# Patient Record
Sex: FEMALE | Race: WHITE
[De-identification: names, ages, dates, MRNs, and addresses within clinical notes are randomized per-mention and may not be internally consistent; named-entity substitution may affect disease eponyms.]

---

## 2017-07-06 ENCOUNTER — HOSPITAL ENCOUNTER (OUTPATIENT)
Dept: HOSPITAL 18 - NAV RAD | Age: 82
Discharge: HOME | End: 2017-07-06
Attending: INTERNAL MEDICINE
Payer: MEDICARE

## 2017-07-06 DIAGNOSIS — M17.9: ICD-10-CM

## 2017-07-06 DIAGNOSIS — M25.461: ICD-10-CM

## 2017-07-06 DIAGNOSIS — M25.552: Primary | ICD-10-CM

## 2017-07-06 NOTE — RAD
RIGHT KNEE FOUR VIEWS:

 

History: Fall two months ago. Pain. 

 

Comparison: None. 

 

FINDINGS: 

There appears to be a suprapatellar effusion. There is severe tricompartmental degenerative change. 
No fracture or malalignment. 

 

IMPRESSION: 

1. Suprapatellar effusion. 

2. Severe tricompartmental degenerative change. 

 

POS: [facility]

## 2017-07-12 ENCOUNTER — HOSPITAL ENCOUNTER (EMERGENCY)
Dept: HOSPITAL 18 - NAV ERS | Age: 82
Discharge: HOME | End: 2017-07-12
Payer: MEDICARE

## 2017-07-12 DIAGNOSIS — Z79.891: ICD-10-CM

## 2017-07-12 DIAGNOSIS — R10.32: ICD-10-CM

## 2017-07-12 DIAGNOSIS — M81.0: ICD-10-CM

## 2017-07-12 DIAGNOSIS — F32.9: ICD-10-CM

## 2017-07-12 DIAGNOSIS — Z79.52: ICD-10-CM

## 2017-07-12 DIAGNOSIS — K21.9: ICD-10-CM

## 2017-07-12 DIAGNOSIS — M16.12: Primary | ICD-10-CM

## 2017-07-12 DIAGNOSIS — E78.5: ICD-10-CM

## 2017-07-12 DIAGNOSIS — Z79.899: ICD-10-CM

## 2017-07-12 DIAGNOSIS — G20: ICD-10-CM

## 2017-07-12 DIAGNOSIS — M17.11: ICD-10-CM

## 2017-07-12 LAB
ALBUMIN SERPL BCG-MCNC: 3.8 G/DL (ref 3.4–4.8)
ALP SERPL-CCNC: 77 U/L (ref 40–150)
ALT SERPL W P-5'-P-CCNC: (no result) U/L (ref 8–55)
AMYLASE SERPL-CCNC: 53 U/L (ref 20–160)
ANION GAP SERPL CALC-SCNC: 16 MMOL/L (ref 10–20)
AST SERPL-CCNC: 14 U/L (ref 5–34)
BACTERIA UR QL AUTO: (no result) HPF
BASOPHILS # BLD AUTO: 0.1 THOU/UL (ref 0–0.2)
BASOPHILS NFR BLD AUTO: 1 % (ref 0–1)
BILIRUB SERPL-MCNC: 0.7 MG/DL (ref 0.2–1.2)
BUN SERPL-MCNC: 19 MG/DL (ref 9.8–20.1)
CALCIUM SERPL-MCNC: 9.4 MG/DL (ref 7.8–10.44)
CHLORIDE SERPL-SCNC: 102 MMOL/L (ref 98–107)
CO2 SERPL-SCNC: 27 MMOL/L (ref 23–31)
CREAT CL PREDICTED SERPL C-G-VRATE: 0 ML/MIN (ref 70–130)
EOSINOPHIL # BLD AUTO: 0.1 THOU/UL (ref 0–0.7)
EOSINOPHIL NFR BLD AUTO: 1.1 % (ref 0–10)
GLOBULIN SER CALC-MCNC: 3.1 G/DL (ref 2.4–3.5)
GLUCOSE SERPL-MCNC: 122 MG/DL (ref 83–110)
HGB BLD-MCNC: 13.7 G/DL (ref 12–16)
LYMPHOCYTES # BLD AUTO: 1.3 THOU/UL (ref 1.2–3.4)
LYMPHOCYTES NFR BLD AUTO: 13.5 % (ref 21–51)
MCH RBC QN AUTO: 28.2 PG (ref 27–31)
MCV RBC AUTO: 90 FL (ref 81–99)
MONOCYTES # BLD AUTO: 0.8 THOU/UL (ref 0.11–0.59)
MONOCYTES NFR BLD AUTO: 8.3 % (ref 0–10)
NEUTROPHILS # BLD AUTO: 7.4 THOU/UL (ref 1.4–6.5)
NEUTROPHILS NFR BLD AUTO: 76.2 % (ref 42–75)
PLATELET # BLD AUTO: 238 THOU/UL (ref 130–400)
POTASSIUM SERPL-SCNC: 3.3 MMOL/L (ref 3.5–5.1)
PROT UR STRIP.AUTO-MCNC: 30 MG/DL
RBC # BLD AUTO: 4.86 MILL/UL (ref 4.2–5.4)
RBC UR QL AUTO: (no result) HPF (ref 0–3)
SODIUM SERPL-SCNC: 142 MMOL/L (ref 136–145)
SP GR UR STRIP: 1.02 (ref 1–1.03)
WBC # BLD AUTO: 9.7 THOU/UL (ref 4.8–10.8)
WBC UR QL AUTO: (no result) HPF (ref 0–3)

## 2017-07-12 PROCEDURE — 36415 COLL VENOUS BLD VENIPUNCTURE: CPT

## 2017-07-12 PROCEDURE — 96375 TX/PRO/DX INJ NEW DRUG ADDON: CPT

## 2017-07-12 PROCEDURE — 74177 CT ABD & PELVIS W/CONTRAST: CPT

## 2017-07-12 PROCEDURE — A4353 INTERMITTENT URINARY CATH: HCPCS

## 2017-07-12 PROCEDURE — 80053 COMPREHEN METABOLIC PANEL: CPT

## 2017-07-12 PROCEDURE — 85025 COMPLETE CBC W/AUTO DIFF WBC: CPT

## 2017-07-12 PROCEDURE — 82150 ASSAY OF AMYLASE: CPT

## 2017-07-12 PROCEDURE — 96374 THER/PROPH/DIAG INJ IV PUSH: CPT

## 2017-07-12 PROCEDURE — 81015 MICROSCOPIC EXAM OF URINE: CPT

## 2017-07-12 PROCEDURE — 96361 HYDRATE IV INFUSION ADD-ON: CPT

## 2017-07-12 PROCEDURE — 81003 URINALYSIS AUTO W/O SCOPE: CPT

## 2017-07-12 PROCEDURE — 72170 X-RAY EXAM OF PELVIS: CPT

## 2017-07-12 PROCEDURE — 51701 INSERT BLADDER CATHETER: CPT

## 2017-07-12 NOTE — RAD
LEFT HIP TWO VIEWS:

 

History: Fall, left hip pain. 

 

FINDINGS/IMPRESSION: 

There are degenerative changes in the left hip joint. No fracture or dislocation is identified. A curt
ne island in the sacrum is stable since 9-21-14. 

 

POS: [facility]

## 2017-07-12 NOTE — RAD
AP PELVIS:

 

History: Fall, left hip pain. 

 

FINDINGS/IMPRESSION: 

Degenerative changes are present in the lower lumbar spine and both hip joints. No acute fracture or
 dislocation is identified. A bone island in the sacrum is stable since 9-21-14.

 

POS: [facility]

## 2017-07-12 NOTE — CT
CT ABDOMEN AND PELVIS WITH IV CONTRAST:

 

Date:  07/12/17 

 

HISTORY:  

Left hip pain after fall, UTI, left abdominal pain. 

 

FINDINGS:

 

Comparison made with exam of 04/08/16. 

 

The lung bases are unremarkable. A cyst in the right lobe of the liver, right kidney, and left kidne
y are stable. There is a small residual perisplenic hematoma compared to the large one noted on the 
previous study. No calcified gallstones are seen. The pancreas and adrenal glands are normal. 

 

No free air, free fluid, or lymphadenopathy seen in the abdomen or pelvis. There are vascular calcif
ications without evidence of aneurysmal dilatation of the abdominal aorta. Degenerative changes are 
present in the spine. Compressed L1 vertebral body is stable. The bone island in the sacrum is again
 seen. No acute fracture or dislocation is noted in either hip. A bone island in the neck of the rig
ht femur and head of the left femur are redemonstrated. 

 

IMPRESSION: 

No evidence of acute process. 

 

 

POS: [facility]

## 2017-07-14 ENCOUNTER — HOSPITAL ENCOUNTER (EMERGENCY)
Dept: HOSPITAL 18 - NAV ERS | Age: 82
Discharge: HOME | End: 2017-07-14
Payer: MEDICARE

## 2017-07-14 DIAGNOSIS — F32.9: ICD-10-CM

## 2017-07-14 DIAGNOSIS — E78.5: ICD-10-CM

## 2017-07-14 DIAGNOSIS — K21.9: ICD-10-CM

## 2017-07-14 DIAGNOSIS — Z79.891: ICD-10-CM

## 2017-07-14 DIAGNOSIS — M81.0: ICD-10-CM

## 2017-07-14 DIAGNOSIS — S51.811A: Primary | ICD-10-CM
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
## 2017-07-14 DIAGNOSIS — Z79.52: ICD-10-CM

## 2017-07-14 DIAGNOSIS — G20: ICD-10-CM

## 2017-07-14 DIAGNOSIS — S00.83XA: ICD-10-CM

## 2017-07-14 DIAGNOSIS — Z79.899: ICD-10-CM

## 2017-07-14 PROCEDURE — 70486 CT MAXILLOFACIAL W/O DYE: CPT

## 2017-07-14 NOTE — CT
CT FACIAL BONES WITH CORONAL AND SAGITTAL REFORMATIONS:

7/14/17

 

HISTORY: 

81-year-old female with fall and trauma to the face on the tile floor, pain in the face.

 

FINDINGS:  

The facial bones appear intact. The visualized paranasal sinuses and mastoid air cells are well aera
fara. No air fluid levels are seen. No temporomandibular dislocation is identified. There is mild sof
t tissue density in the subcutaneous fat of the right maxillary region likely due to trauma. There a
re degenerative changes in the visualized portions of the cervical spine.

 

IMPRESSION:  

No CT evidence of facial bone fracture. 

 

POS: [facility]

## 2017-10-18 ENCOUNTER — HOSPITAL ENCOUNTER (EMERGENCY)
Dept: HOSPITAL 18 - NAV ERS | Age: 82
Discharge: HOME | End: 2017-10-18
Payer: MEDICARE

## 2017-10-18 DIAGNOSIS — K21.9: ICD-10-CM

## 2017-10-18 DIAGNOSIS — S00.03XA: ICD-10-CM

## 2017-10-18 DIAGNOSIS — S41.112A: Primary | ICD-10-CM

## 2017-10-18 DIAGNOSIS — E78.5: ICD-10-CM

## 2017-10-18 DIAGNOSIS — S70.02XA: ICD-10-CM
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
## 2017-10-18 DIAGNOSIS — N39.0: ICD-10-CM

## 2017-10-18 DIAGNOSIS — Z79.899: ICD-10-CM

## 2017-10-18 DIAGNOSIS — M81.0: ICD-10-CM

## 2017-10-18 DIAGNOSIS — F32.9: ICD-10-CM

## 2017-10-18 DIAGNOSIS — G20: ICD-10-CM

## 2017-10-18 DIAGNOSIS — Z79.891: ICD-10-CM

## 2017-10-18 LAB
ALBUMIN SERPL BCG-MCNC: 3.6 G/DL (ref 3.4–4.8)
ALP SERPL-CCNC: 67 U/L (ref 40–150)
ALT SERPL W P-5'-P-CCNC: (no result) U/L (ref 8–55)
ANION GAP SERPL CALC-SCNC: 11 MMOL/L (ref 10–20)
AST SERPL-CCNC: 8 U/L (ref 5–34)
BACTERIA UR QL AUTO: (no result) HPF
BASOPHILS # BLD AUTO: 0 THOU/UL (ref 0–0.2)
BASOPHILS NFR BLD AUTO: 0.5 % (ref 0–1)
BILIRUB SERPL-MCNC: 0.4 MG/DL (ref 0.2–1.2)
BUN SERPL-MCNC: 20 MG/DL (ref 9.8–20.1)
CALCIUM SERPL-MCNC: 9.1 MG/DL (ref 7.8–10.44)
CHLORIDE SERPL-SCNC: 103 MMOL/L (ref 98–107)
CK MB SERPL-MCNC: 0.5 NG/ML (ref 0–6.6)
CO2 SERPL-SCNC: 32 MMOL/L (ref 23–31)
CREAT CL PREDICTED SERPL C-G-VRATE: 0 ML/MIN (ref 70–130)
EOSINOPHIL # BLD AUTO: 0.1 THOU/UL (ref 0–0.7)
EOSINOPHIL NFR BLD AUTO: 0.7 % (ref 0–10)
GLOBULIN SER CALC-MCNC: 2.5 G/DL (ref 2.4–3.5)
GLUCOSE SERPL-MCNC: 97 MG/DL (ref 83–110)
HGB BLD-MCNC: 12 G/DL (ref 12–16)
LYMPHOCYTES # BLD AUTO: 1.7 THOU/UL (ref 1.2–3.4)
LYMPHOCYTES NFR BLD AUTO: 18.6 % (ref 21–51)
MCH RBC QN AUTO: 30.2 PG (ref 27–31)
MCV RBC AUTO: 97 FL (ref 81–99)
MONOCYTES # BLD AUTO: 0.8 THOU/UL (ref 0.11–0.59)
MONOCYTES NFR BLD AUTO: 8.3 % (ref 0–10)
NEUTROPHILS # BLD AUTO: 6.5 THOU/UL (ref 1.4–6.5)
NEUTROPHILS NFR BLD AUTO: 71.8 % (ref 42–75)
PLATELET # BLD AUTO: 226 THOU/UL (ref 130–400)
POTASSIUM SERPL-SCNC: 3.2 MMOL/L (ref 3.5–5.1)
PROT UR STRIP.AUTO-MCNC: 30 MG/DL
RBC # BLD AUTO: 3.98 MILL/UL (ref 4.2–5.4)
RBC UR QL AUTO: (no result) HPF (ref 0–3)
SODIUM SERPL-SCNC: 143 MMOL/L (ref 136–145)
SP GR UR STRIP: 1.02 (ref 1–1.03)
TROPONIN I SERPL DL<=0.01 NG/ML-MCNC: (no result) NG/ML (ref ?–0.03)
WBC # BLD AUTO: 9 THOU/UL (ref 4.8–10.8)
WBC UR QL AUTO: (no result) HPF (ref 0–3)

## 2017-10-18 PROCEDURE — 87086 URINE CULTURE/COLONY COUNT: CPT

## 2017-10-18 PROCEDURE — 85025 COMPLETE CBC W/AUTO DIFF WBC: CPT

## 2017-10-18 PROCEDURE — 81015 MICROSCOPIC EXAM OF URINE: CPT

## 2017-10-18 PROCEDURE — 96365 THER/PROPH/DIAG IV INF INIT: CPT

## 2017-10-18 PROCEDURE — 80053 COMPREHEN METABOLIC PANEL: CPT

## 2017-10-18 PROCEDURE — 70450 CT HEAD/BRAIN W/O DYE: CPT

## 2017-10-18 PROCEDURE — 84484 ASSAY OF TROPONIN QUANT: CPT

## 2017-10-18 PROCEDURE — 87077 CULTURE AEROBIC IDENTIFY: CPT

## 2017-10-18 PROCEDURE — 81003 URINALYSIS AUTO W/O SCOPE: CPT

## 2017-10-18 PROCEDURE — 36415 COLL VENOUS BLD VENIPUNCTURE: CPT

## 2017-10-18 PROCEDURE — 93005 ELECTROCARDIOGRAM TRACING: CPT

## 2017-10-18 PROCEDURE — 82553 CREATINE MB FRACTION: CPT

## 2017-10-18 NOTE — RAD
LEFT HP TWO VIEWS:

10/18/17

 

HISTORY: 

Fall. Left hip pain.

 

FINDINGS/IMPRESSION:  

Comparison made with exam of 7/12/17. 

 

Degenerative changes are present. No acute fracture or dislocation is identified. A bone island in t
he sacrum is stable since 9/21/14. 

 

POS: [facility]

## 2017-10-18 NOTE — CT
CT BRAIN WITHOUT CONTRAST:

10/18/17

 

HISTORY: 

Fall, dizziness.

 

FINDINGS:  

Comparison made to exam of 3/27/16. 

 

Changes of cortical atrophy and chronic small vessel ischemic disease are again seen. The ventricula
r size is appropriate and the basilar cisterns patent. No evidence of acute infarct, hemorrhage, mid
line shift or abnormal extra-axial fluid collections are seen. The bony calvarium is intact. The vis
ualized paranasal sinuses and mastoid air cells are well aerated. There is a small scalp contusion i
n the left posterior parietal region. 

 

IMPRESSION:  

No CT evidence of acute intracranial process. 

 

POS: [facility]

## 2017-11-12 ENCOUNTER — HOSPITAL ENCOUNTER (EMERGENCY)
Dept: HOSPITAL 18 - NAV ERS | Age: 82
Discharge: HOME | End: 2017-11-12
Payer: MEDICARE

## 2017-11-12 DIAGNOSIS — S01.81XA: Primary | ICD-10-CM

## 2017-11-12 DIAGNOSIS — G20: ICD-10-CM

## 2017-11-12 DIAGNOSIS — K21.9: ICD-10-CM
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
## 2017-11-12 DIAGNOSIS — F32.9: ICD-10-CM

## 2017-11-12 DIAGNOSIS — Z79.899: ICD-10-CM

## 2017-11-12 DIAGNOSIS — Z79.891: ICD-10-CM

## 2017-11-12 DIAGNOSIS — E78.5: ICD-10-CM

## 2017-11-12 DIAGNOSIS — S51.811A: ICD-10-CM

## 2017-11-12 PROCEDURE — 12015 RPR F/E/E/N/L/M 7.6-12.5 CM: CPT

## 2017-11-12 PROCEDURE — 70486 CT MAXILLOFACIAL W/O DYE: CPT

## 2017-11-12 PROCEDURE — 70450 CT HEAD/BRAIN W/O DYE: CPT

## 2017-11-12 NOTE — CT
FACIAL BONE CT SCAN WITHOUT IV CONTRAST:

 

History: 

81-year-old female with facial injury following a fall earlier today. 

 

Comparison: 

7-14-17

 

FINDINGS: 

Soft tissue swelling is noted over the anterior frontal bone. The orbits appear unremarkable. Zygoma
tic arches and mandible are intact. No significant sinus fluid or mucosal disease. 

 

IMPRESSION: 

No significant fracture or dislocation or other acute process of the face. 

 

POS: [facility]

## 2017-11-12 NOTE — CT
BRAIN CT SCAN WITHOUT IV CONTRAST:

 

History: 

81-year-old female with injury to forehead, face, and right forearm after a fall earlier today. 

 

FINDINGS: 

Atrophy and chronic white matter ischemic changes are noted bilaterally. No focal mass or midline sh
ift. No intra or extraaxial hemorrhage. Soft tissue swelling noted over the anterior frontal region.
 Sinuses and mastoids are clear. 

 

IMPRESSION: 

Frontal soft tissue scalp swelling. No mass or bleed or other acute process. 

 

POS: [facility]

## 2018-03-20 ENCOUNTER — HOSPITAL ENCOUNTER (OUTPATIENT)
Dept: HOSPITAL 18 - NAV CT | Age: 83
Discharge: HOME | End: 2018-03-20
Attending: INTERNAL MEDICINE
Payer: MEDICARE

## 2018-03-20 DIAGNOSIS — G44.89: ICD-10-CM

## 2018-03-20 DIAGNOSIS — R63.4: ICD-10-CM

## 2018-03-20 DIAGNOSIS — G20: Primary | ICD-10-CM

## 2018-03-20 LAB — CREAT CL PREDICTED SERPL C-G-VRATE: 0 ML/MIN (ref 70–130)

## 2018-03-20 PROCEDURE — 36415 COLL VENOUS BLD VENIPUNCTURE: CPT

## 2018-03-20 PROCEDURE — 74177 CT ABD & PELVIS W/CONTRAST: CPT

## 2018-03-20 PROCEDURE — 82565 ASSAY OF CREATININE: CPT

## 2018-03-20 PROCEDURE — 70450 CT HEAD/BRAIN W/O DYE: CPT

## 2018-03-20 NOTE — CT
ABDOMEN CT WITH CONTRAST

PELVIC CT WITH CONTRAST:

 

Comparison: 7-12-17

 

History: Weight loss. 

 

Technique: Abdomen and pelvic CT are performed with IV contrast. Coronal reformatted images are submi
tted for interpretation.

 

FINDINGS: 

 

ABDOMEN CT:

Lung bases are clear. Normal heart size. No significant pericardial fluid. Visualized aorta is unrema
rkable and unchanged. There is some atherosclerosis. 

 

Gallbladder is unremarkable. Liver, spleen, pancreas and adrenal glands show stable enhancement. 

 

Hypodensity involving the tip of the liver is noted and is too small to characterize. A small cyst is
 favored. There is a small amount of subcapsular fluid adjacent to the lateral margin of the spleen. 


 

No gastrohepatic, retrocrural, or lymphadenopathy. 

 

No mass, lymphadenopathy, free air or free fluid. 

 

Gastric mucosa, duodenum and multiple caliber small bowel loops are noted. Ileocecal junction is norm
al. Appendix is not appreciated. Scattered fecal material in nondistended, nondilated colon. 1.5 x 1.
8 cm hypodensity in the right kidney with attenuation of 13 Hounsfield units, compatible with a cyst.
 Symmetric enhancement of the kidneys. Bilaterally, no obstructive uropathy. 

 

PELVIC CT:

Urinary bladder is unremarkable. No pelvic mass, lymphadenopathy, free air or free fluid. 

 

No lytic or blastic lesions within the osseous structures. Stable chronic compression fracture at the
 L1 level. 

 

There is soft tissue swelling and peripherally enhancing, centrally hypodense collections in the righ
t gluteal muscle. Intramuscular abscess or fluid are differential considerations. Evaluation is incom
plete. Better interrogation with MRI is recommended. Stable hypodensities in the anterior right lower
 quadrant subcutaneous fat with a measurement of 1.0 cm and a 9 Hounsfield units, suggesting a cyst. 


 

IMPRESSION: 

1. Abnormal fluid in the right gluteal muscle. Better interrogation with MRI is recommended to exclud
e possible soft tissue abscess. 

2. No acute abnormality in the abdomen or pelvis.  

 

Code T

 

POS: [facility]

## 2018-03-20 NOTE — CT
BRAIN CT WITHOUT IV CONTRAST:

 

HISTORY:

An 82-year-old female with a history of Parkinson disease and weight loss.

 

COMPARISON:

11/12/2017

 

FINDINGS:

Severe atrophy and chronic white matter ischemic change.  No focal mass or midline shift.  The sinuse
s and mastoids show no significant acute process.

 

IMPRESSION:

Stable chronic changes.  No mass or bleed.  Some prominent atrophy and chronic white matter ischemic 
change.

 

POS: [facility]

## 2018-03-26 ENCOUNTER — HOSPITAL ENCOUNTER (EMERGENCY)
Dept: HOSPITAL 18 - NAV ERS | Age: 83
Discharge: HOME | End: 2018-03-26
Payer: MEDICARE

## 2018-03-26 DIAGNOSIS — Z79.52: ICD-10-CM

## 2018-03-26 DIAGNOSIS — S01.01XA: Primary | ICD-10-CM

## 2018-03-26 DIAGNOSIS — G20: ICD-10-CM

## 2018-03-26 DIAGNOSIS — E78.5: ICD-10-CM
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
## 2018-03-26 DIAGNOSIS — M81.0: ICD-10-CM

## 2018-03-26 DIAGNOSIS — M19.90: ICD-10-CM

## 2018-03-26 DIAGNOSIS — S51.811A: ICD-10-CM
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
## 2018-03-26 DIAGNOSIS — F32.9: ICD-10-CM

## 2018-03-26 DIAGNOSIS — Z79.899: ICD-10-CM

## 2018-03-26 DIAGNOSIS — K21.9: ICD-10-CM

## 2018-03-26 DIAGNOSIS — S61.412A: ICD-10-CM

## 2018-03-26 DIAGNOSIS — S00.83XA: ICD-10-CM

## 2018-03-26 DIAGNOSIS — S41.012A: ICD-10-CM

## 2018-03-26 PROCEDURE — 90715 TDAP VACCINE 7 YRS/> IM: CPT

## 2018-03-26 PROCEDURE — 70486 CT MAXILLOFACIAL W/O DYE: CPT

## 2018-03-26 PROCEDURE — 70450 CT HEAD/BRAIN W/O DYE: CPT

## 2018-03-26 PROCEDURE — 90471 IMMUNIZATION ADMIN: CPT

## 2018-03-26 NOTE — CT
NONCONTRAST CT FACIAL BONES:

 

03/26/2018

 

HISTORY:

Multiple falls.  Injury after fall.

 

COMPARISON:

11/12/2017

 

FINDINGS:

There is no evidence of a fracture.  The paranasal sinuses and mastoid air cells are clear.  The temp
oromandibular joints are normal in appearance and normally located.  The orbits are normal and symmet
shimon in appearance bilaterally without post septal hematoma or stranding present.  There is absence of
 the native lenses.  There has been no interval change compared to the prior exam.  Prominent degener
ative changes are seen in the cervical spine with vascular calcification seen in the left internal ca
rotid artery, partially imaged.

 

IMPRESSION:

No acute fracture is seen involving the facial bones.

 

POS: [facility]

## 2018-03-26 NOTE — RAD
THREE VIEWS LEFT SHOULDER:

 

03/26/2018

 

HISTORY:

Trauma.  Multiple falls secondary to Parkinson disease.

 

FINDINGS:

The coracoclavicular and acromioclavicular distances are within normal limits.  No fracture or disloc
ation is seen involving the left shoulder.  No other osseous abnormality.

 

IMPRESSION:

No acute osseous abnormality, left shoulder.

 

POS: [facility]

## 2018-03-26 NOTE — CT
CT BRAIN WITHOUT CONTRAST:

 

Date:  03/26/18 

 

HISTORY:  

Multiple falls. Parkinson's. Fall this morning with left-sided head swelling. 

 

FINDINGS:

 

Comparison made with exam of 03/20/18. 

 

Changes of cortical atrophy and chronic small vessel ischemic disease are again seen. The ventricular
 size is stable and the basilar cisterns are patent. No evidence of acute infarct, hemorrhage, midlin
e shift, or abnormal extra-axial fluid collections are seen. The bony calvarium is intact. There is s
oft tissue contusion in the left posteroparietal scalp. The visualized paranasal sinuses and mastoid 
air cells are well aerated. 

 

IMPRESSION: 

No CT evidence of acute intracranial process. 

 

 

POS: [facility]

## 2018-04-05 ENCOUNTER — HOSPITAL ENCOUNTER (EMERGENCY)
Dept: HOSPITAL 18 - NAV ERS | Age: 83
Discharge: ANOTHER HOSPITAL | End: 2018-04-05
Payer: MEDICARE

## 2018-04-05 DIAGNOSIS — M81.0: ICD-10-CM

## 2018-04-05 DIAGNOSIS — E78.5: ICD-10-CM

## 2018-04-05 DIAGNOSIS — Z87.891: ICD-10-CM

## 2018-04-05 DIAGNOSIS — F32.9: ICD-10-CM

## 2018-04-05 DIAGNOSIS — Z79.899: ICD-10-CM

## 2018-04-05 DIAGNOSIS — G20: ICD-10-CM

## 2018-04-05 DIAGNOSIS — M19.90: ICD-10-CM

## 2018-04-05 DIAGNOSIS — S01.411A: ICD-10-CM

## 2018-04-05 DIAGNOSIS — S20.211A: ICD-10-CM

## 2018-04-05 DIAGNOSIS — S06.339A: Primary | ICD-10-CM

## 2018-04-05 DIAGNOSIS — K21.9: ICD-10-CM

## 2018-04-05 DIAGNOSIS — Z79.52: ICD-10-CM
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
## 2018-04-05 DIAGNOSIS — S80.01XA: ICD-10-CM

## 2018-04-05 LAB
ALBUMIN SERPL BCG-MCNC: 4.1 G/DL (ref 3.4–4.8)
ALP SERPL-CCNC: 78 U/L (ref 40–150)
ALT SERPL W P-5'-P-CCNC: 6 U/L (ref 8–55)
ANION GAP SERPL CALC-SCNC: 16 MMOL/L (ref 10–20)
APTT PPP: 33 SEC (ref 22.9–36.1)
AST SERPL-CCNC: 11 U/L (ref 5–34)
BASOPHILS # BLD AUTO: 0.1 THOU/UL (ref 0–0.2)
BASOPHILS NFR BLD AUTO: 0.8 % (ref 0–1)
BILIRUB SERPL-MCNC: 0.7 MG/DL (ref 0.2–1.2)
BUN SERPL-MCNC: 11 MG/DL (ref 9.8–20.1)
CALCIUM SERPL-MCNC: 10 MG/DL (ref 7.8–10.44)
CHLORIDE SERPL-SCNC: 98 MMOL/L (ref 98–107)
CO2 SERPL-SCNC: 28 MMOL/L (ref 23–31)
CREAT CL PREDICTED SERPL C-G-VRATE: 0 ML/MIN (ref 70–130)
EOSINOPHIL # BLD AUTO: 0 THOU/UL (ref 0–0.7)
EOSINOPHIL NFR BLD AUTO: 0 % (ref 0–10)
GLOBULIN SER CALC-MCNC: 3.4 G/DL (ref 2.4–3.5)
GLUCOSE SERPL-MCNC: 99 MG/DL (ref 83–110)
HGB BLD-MCNC: 13.3 G/DL (ref 12–16)
INR PPP: 1
LYMPHOCYTES # BLD AUTO: 1.5 THOU/UL (ref 1.2–3.4)
LYMPHOCYTES NFR BLD AUTO: 11.6 % (ref 21–51)
MCH RBC QN AUTO: 27.1 PG (ref 27–31)
MCV RBC AUTO: 86.9 FL (ref 81–99)
MDIFF COMPLETE?: YES
MONOCYTES # BLD AUTO: 0.8 THOU/UL (ref 0.11–0.59)
MONOCYTES NFR BLD AUTO: 6.4 % (ref 0–10)
NEUTROPHILS # BLD AUTO: 10.5 THOU/UL (ref 1.4–6.5)
NEUTROPHILS NFR BLD AUTO: 81.3 % (ref 42–75)
PLATELET # BLD AUTO: 284 THOU/UL (ref 130–400)
PLATELET BLD QL SMEAR: (no result)
POTASSIUM SERPL-SCNC: 4.3 MMOL/L (ref 3.5–5.1)
PROTHROMBIN TIME: 13.3 SEC (ref 12–14.7)
RBC # BLD AUTO: 4.91 MILL/UL (ref 4.2–5.4)
SODIUM SERPL-SCNC: 138 MMOL/L (ref 136–145)
WBC # BLD AUTO: 12.9 THOU/UL (ref 4.8–10.8)

## 2018-04-05 PROCEDURE — 73564 X-RAY EXAM KNEE 4 OR MORE: CPT

## 2018-04-05 PROCEDURE — 85025 COMPLETE CBC W/AUTO DIFF WBC: CPT

## 2018-04-05 PROCEDURE — 12013 RPR F/E/E/N/L/M 2.6-5.0 CM: CPT

## 2018-04-05 PROCEDURE — 85730 THROMBOPLASTIN TIME PARTIAL: CPT

## 2018-04-05 PROCEDURE — G0390 TRAUMA RESPONS W/HOSP CRITI: HCPCS

## 2018-04-05 PROCEDURE — 70486 CT MAXILLOFACIAL W/O DYE: CPT

## 2018-04-05 PROCEDURE — 71260 CT THORAX DX C+: CPT

## 2018-04-05 PROCEDURE — 85610 PROTHROMBIN TIME: CPT

## 2018-04-05 PROCEDURE — 80053 COMPREHEN METABOLIC PANEL: CPT

## 2018-04-05 PROCEDURE — 93005 ELECTROCARDIOGRAM TRACING: CPT

## 2018-04-05 PROCEDURE — 70450 CT HEAD/BRAIN W/O DYE: CPT

## 2018-04-05 PROCEDURE — 99285 EMERGENCY DEPT VISIT HI MDM: CPT

## 2018-04-05 PROCEDURE — 72125 CT NECK SPINE W/O DYE: CPT

## 2018-04-05 PROCEDURE — 96374 THER/PROPH/DIAG INJ IV PUSH: CPT

## 2018-04-05 NOTE — CT
CT CERVICAL SPINE:

4/5/18

 

Multiple axial tomograms obtained through the cervical spine with multiplanar reconstruction. 

 

HISTORY:  

Fall today with injury to face and neck. 

 

Comparison made to CT cervical spine of 3/26/16.

 

There are prominent degenerative changes in the cervical spine. loss of disc space throughout. Mild s
ubluxations at C4-5 and C5-6 appear stable from prior exam. No evidence of fracture. There is promine
nt facet hypertrophy at all levels. Spondylitic changes encroach into the spinal canal. There is fora
yvan encroachment at multiple levels. 

 

IMPRESSION:  

Prominent degenerative changes of the cervical spine. No acute fracture identified. 

 

POS: [facility]

## 2018-04-05 NOTE — CT
CT HEAD WITHOUT CONTRAST:

4/5/18

 

Multiple axial tomograms obtained through the head without IV enhancement. 

 

HISTORY: 

Fall with injury to head. Positive loss of consciousness. 

 

COMPARISON:  

Comparison made to head CT of 3/26/18.

 

FINDINGS:  

There is cortical atrophy. There is s subtle areas of increased density in the subcortical region of 
the anterior right frontal lobe cortex. I cannot completely exclude mild cortical contusion at this s
ite. There is no other evidence of hemorrhage. There is no subdural or epidural hematoma seen. There 
is no evidence of skull fracture.

 

IMPRESSION:  

Question, mild cortical contusion in the anterior frontal lobe cortex. Suggest followup Ct in 24 hour
s to re-evaluate.

 

Findings discussed with Dr. Hubbard.

 

Code CR

 

POS: [facility]

## 2018-04-05 NOTE — CT
CHEST CT SCAN WITH IV CONTRAST:

4/5/18

 

HISTORY: 

82-year-old female with history of fall with bruising on right side of face. 

 

The mediastinum is unremarkable. Very small nodule in the right lobe of the thyroid. Small scattered 
mediastinal lymph nodes without evidence of adenopathy. No evidence for aortic aneurysm or dissection
. Two vessel calcific disease. No evidence for pleural effusion or pericardial effusion. No pneumotho
rax. Minimal dependent positional changes in the lower chest. Visualized upper abdomen is unremarkabl
e. There is some vertical height loss of the L1 vertebral body which has more of an old with associat
ed disc osteophytosis and very mild chronic appearing vertical height loss of T10 as well. 

 

IMPRESSION:  

No significant acute posttraumatic process in the chest. 

 

POS: [facility]

## 2018-04-05 NOTE — CT
CT FACIAL BONES:

4/5/18

 

Multiple axial tomograms obtained through the facial bones with multiplanar reconstruction.

 

HISTORY: 

Fall with injury to face. 

 

FINDINGS:  

Soft tissue windows show subcutaneous edema involving the right cheek. This overlies the right maxill
a and zygoma. 

 

The nasal bones appear intact. The orbits appear intact. Lamina papyracea are intact. The zygoma appe
ar intact. 

 

The paranasal sinuses are well aerated. The maxilla appears intact. Mandible appears intact. 

 

IMPRESSION:  

1.      Subcutaneous edema and swelling right face. 

2.      No evidence of acute facial bone fracture. 

 

POS: [facility]

## 2018-04-05 NOTE — RAD
RIGHT KNEE:

4/5/18

 

Four views.

 

HISTORY: 

Fall with injury to right knee. Knee pain.

 

Severe degenerative changes. There is loss of both medial and lateral joint space. Prominent spurring
 from all joint compartments. There is evidence of joint effusion seen in the suprapatellar region. N
o acute fracture identified.

 

IMPRESSION:  

Severe degenerative changes of the right knee with joint effusion noted. 

 

POS: [facility]

## 2018-05-26 ENCOUNTER — HOSPITAL ENCOUNTER (EMERGENCY)
Dept: HOSPITAL 18 - NAV ERS | Age: 83
LOS: 1 days | Discharge: HOME | End: 2018-05-27
Payer: MEDICARE

## 2018-05-26 DIAGNOSIS — Z87.891: ICD-10-CM

## 2018-05-26 DIAGNOSIS — M41.9: ICD-10-CM
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
## 2018-05-26 DIAGNOSIS — E78.5: ICD-10-CM

## 2018-05-26 DIAGNOSIS — Z79.899: ICD-10-CM

## 2018-05-26 DIAGNOSIS — K21.9: ICD-10-CM

## 2018-05-26 DIAGNOSIS — F32.9: ICD-10-CM
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
## 2018-05-26 DIAGNOSIS — G20: ICD-10-CM

## 2018-05-26 DIAGNOSIS — M81.0: ICD-10-CM

## 2018-05-26 DIAGNOSIS — S51.011A: ICD-10-CM

## 2018-05-26 DIAGNOSIS — S01.01XA: Primary | ICD-10-CM

## 2018-05-26 DIAGNOSIS — M19.90: ICD-10-CM

## 2018-05-26 PROCEDURE — 72125 CT NECK SPINE W/O DYE: CPT

## 2018-05-26 PROCEDURE — 70450 CT HEAD/BRAIN W/O DYE: CPT

## 2018-05-26 PROCEDURE — 12002 RPR S/N/AX/GEN/TRNK2.6-7.5CM: CPT

## 2018-05-26 NOTE — CT
CT HEAD NONCONTRAST:

 

INDICATIONS:

Trauma.  Head injury.  Pain.

 

COMPARISON:

04/06/2018

 

FINDINGS:

There is parenchymal atrophy with compensatory dilatation of the ventricular system.  Mild chronic mi
crovascular ischemic disease is present.  No intracranial hemorrhage, mass effect, or midline shift. 
 The calvarium is intact.  No acute fluid levels of the paranasal sinuses visualized.

 

IMPRESSION:

No acute intracranial abnormalities.

 

POS: [facility]

## 2018-05-27 NOTE — CT
CERVICAL SPINE CT NONCONTRAST:

 

INDICATIONS:

Posttraumatic neck pain.

 

FINDINGS:

There is extensive, multilevel degenerative change of the cervical spine with obliteration of multile
tia disk space, prominent endplate irregularity, and marginal osteophyte formation, as well as multil
evel mild listhesis.  There is prominent multilevel bilateral facet degenerative  hypertrophy.  No ac
[place] craniocervical distraction injury or acute compression fracture evident.  No significant retropul
je[place] of bone into the vertebral canal.  Multilevel disk osteophyte formation does efface the contents
 of the vertebral canal, limiting assessment by noncontrast CT imaging.  There is cortical irregulari
ty with underlying air density of the posteromedial right second rib.  This could either relate to a 
fracture with introduced air versus a degenerative process at the site of costovertebral articulation
.  Correlate for the presence of acute pain in this region.  There is incidental note of atherosclero
sis.

 

IMPRESSION:

Prominent degenerative changes throughout the cervical spine.  No definite acute fracture visualized 
within the cervical spine.

 

Additional details are as described above.

 

POS: [facility]

## 2018-06-06 ENCOUNTER — HOSPITAL ENCOUNTER (EMERGENCY)
Dept: HOSPITAL 18 - NAV ERS | Age: 83
Discharge: HOME | End: 2018-06-06
Payer: MEDICARE

## 2018-06-06 DIAGNOSIS — Z87.891: ICD-10-CM

## 2018-06-06 DIAGNOSIS — F32.9: ICD-10-CM

## 2018-06-06 DIAGNOSIS — G20: ICD-10-CM

## 2018-06-06 DIAGNOSIS — S01.01XD: Primary | ICD-10-CM

## 2018-06-06 DIAGNOSIS — Z79.899: ICD-10-CM

## 2018-06-06 DIAGNOSIS — K21.9: ICD-10-CM

## 2018-06-06 DIAGNOSIS — E78.5: ICD-10-CM

## 2018-06-06 DIAGNOSIS — M81.0: ICD-10-CM
